# Patient Record
Sex: FEMALE | URBAN - METROPOLITAN AREA
[De-identification: names, ages, dates, MRNs, and addresses within clinical notes are randomized per-mention and may not be internally consistent; named-entity substitution may affect disease eponyms.]

---

## 2019-06-28 ENCOUNTER — APPOINTMENT (RX ONLY)
Dept: URBAN - METROPOLITAN AREA CLINIC 71 | Facility: CLINIC | Age: 44
Setting detail: DERMATOLOGY
End: 2019-06-28

## 2019-06-28 DIAGNOSIS — L21.8 OTHER SEBORRHEIC DERMATITIS: ICD-10-CM

## 2019-06-28 DIAGNOSIS — H01.13 ECZEMATOUS DERMATITIS OF EYELID: ICD-10-CM

## 2019-06-28 PROBLEM — H01.131 ECZEMATOUS DERMATITIS OF RIGHT UPPER EYELID: Status: ACTIVE | Noted: 2019-06-28

## 2019-06-28 PROCEDURE — ? COUNSELING

## 2019-06-28 PROCEDURE — 99202 OFFICE O/P NEW SF 15 MIN: CPT

## 2019-06-28 PROCEDURE — ? PRESCRIPTION

## 2019-06-28 PROCEDURE — ? TREATMENT REGIMEN

## 2019-06-28 PROCEDURE — ? ADDITIONAL NOTES

## 2019-06-28 PROCEDURE — ? INVENTORY

## 2019-06-28 RX ORDER — FLUOCINOLONE ACETONIDE 0.1 MG/ML
SOLUTION TOPICAL
Qty: 1 | Refills: 3 | Status: ERX | COMMUNITY
Start: 2019-06-28

## 2019-06-28 RX ORDER — PIMECROLIMUS 10 MG/G
CREAM TOPICAL BID
Qty: 1 | Refills: 2 | Status: ERX | COMMUNITY
Start: 2019-06-28

## 2019-06-28 RX ORDER — KETOCONAZOLE 20.5 MG/ML
SHAMPOO, SUSPENSION TOPICAL
Qty: 2 | Refills: 6 | Status: ERX | COMMUNITY
Start: 2019-06-28

## 2019-06-28 RX ORDER — HYDROCORTISONE 25 MG/G
CREAM TOPICAL
Qty: 1 | Refills: 2 | Status: ERX | COMMUNITY
Start: 2019-06-28

## 2019-06-28 RX ADMIN — FLUOCINOLONE ACETONIDE: 0.1 SOLUTION TOPICAL at 12:07

## 2019-06-28 RX ADMIN — PIMECROLIMUS: 10 CREAM TOPICAL at 12:04

## 2019-06-28 RX ADMIN — HYDROCORTISONE: 25 CREAM TOPICAL at 12:05

## 2019-06-28 RX ADMIN — KETOCONAZOLE: 20.5 SHAMPOO, SUSPENSION TOPICAL at 12:07

## 2019-06-28 ASSESSMENT — LOCATION DETAILED DESCRIPTION DERM
LOCATION DETAILED: LEFT MEDIAL FRONTAL SCALP
LOCATION DETAILED: RIGHT LATERAL SUPERIOR EYELID

## 2019-06-28 ASSESSMENT — LOCATION SIMPLE DESCRIPTION DERM
LOCATION SIMPLE: LEFT SCALP
LOCATION SIMPLE: RIGHT SUPERIOR EYELID

## 2019-06-28 ASSESSMENT — LOCATION ZONE DERM
LOCATION ZONE: SCALP
LOCATION ZONE: EYELID

## 2019-06-28 NOTE — PROCEDURE: TREATMENT REGIMEN
Action 1: Start
Detail Level: Zone
Action 4: Continue
Treatment 1: fluocinolone solution
Sig For Treatment 1 (If Needed): every other day
Sig For Treatment 2 (If Needed): once daily
Show Eltamd Line: Yes
Treatment 1: Hydrocortisone 2.5% cream
Treatment 2: Elidel 1% cream

## 2019-06-28 NOTE — PROCEDURE: ADDITIONAL NOTES
Additional Notes: Xyzal qd was recommended\\n\\nPatient to try and fill elidel first, if not covered use hydrocortisone
Detail Level: Simple

## 2019-08-01 ENCOUNTER — APPOINTMENT (RX ONLY)
Dept: URBAN - METROPOLITAN AREA CLINIC 74 | Facility: CLINIC | Age: 44
Setting detail: DERMATOLOGY
End: 2019-08-01

## 2019-08-01 DIAGNOSIS — H01.13 ECZEMATOUS DERMATITIS OF EYELID: ICD-10-CM

## 2019-08-01 PROBLEM — H01.131 ECZEMATOUS DERMATITIS OF RIGHT UPPER EYELID: Status: ACTIVE | Noted: 2019-08-01

## 2019-08-01 PROCEDURE — ? TREATMENT REGIMEN

## 2019-08-01 PROCEDURE — ? COUNSELING

## 2019-08-01 PROCEDURE — ? PRESCRIPTION

## 2019-08-01 PROCEDURE — ? INVENTORY

## 2019-08-01 PROCEDURE — ? PRESCRIPTION SAMPLES PROVIDED

## 2019-08-01 PROCEDURE — 99212 OFFICE O/P EST SF 10 MIN: CPT

## 2019-08-01 PROCEDURE — ? RECOMMENDATIONS

## 2019-08-01 RX ORDER — DESONIDE 0.5 MG/G
OINTMENT TOPICAL
Qty: 1 | Refills: 1 | Status: ERX | COMMUNITY
Start: 2019-08-01

## 2019-08-01 RX ADMIN — DESONIDE: 0.5 OINTMENT TOPICAL at 20:14

## 2019-08-01 ASSESSMENT — LOCATION ZONE DERM: LOCATION ZONE: EYELID

## 2019-08-01 ASSESSMENT — LOCATION SIMPLE DESCRIPTION DERM: LOCATION SIMPLE: RIGHT SUPERIOR EYELID

## 2019-08-01 ASSESSMENT — LOCATION DETAILED DESCRIPTION DERM: LOCATION DETAILED: RIGHT LATERAL SUPERIOR EYELID

## 2019-08-01 NOTE — PROCEDURE: TREATMENT REGIMEN
Otc Regimen: Vaniply ointment layered under Eucrisa ointment
Initiate Treatment: Desonide .05% ointment apply twice daily up to 7 days, taper to daily for 7 days then add\\nEucrisa 2% ointment apply daily for maintenance
Plan: Continue Xyzal daily for allergies
Detail Level: Zone
See HPI

## 2019-08-01 NOTE — PROCEDURE: RECOMMENDATIONS
Recommendation Preamble: The following recommendations were made during the visit:
Detail Level: Zone
Recommendations (Free Text): Warm compresses
